# Patient Record
(demographics unavailable — no encounter records)

---

## 2020-06-12 NOTE — NUR
PT AGREEABLE TO STAY AT THIS TIME, EVENING MEDS GIVEN AS ORDERED, PT
REQUESTING SNACK, EMILY MUÑOZ PROVIDED, PT DENIES FURTHER NEEDS, PT
CALMER, FIANCE REMAINS AT BS.

## 2020-06-12 NOTE — NUR
REASSESSMENT COMPLETED, PT RESTING QUIETLY WATCHING TV, PT DENIES PAIN OR
NEEDS, TONGUE EXAMINED WITH NO VISIBLE EDEMA NOTED, VSS, WILL CONT TO MONITOR
CLOSELY FOR CHANGES.

## 2020-06-12 NOTE — NUR
PT WANTING TO LEAVE AMA, ACCORDING TO MOTHER AND SIGNIFICANT OTHER PT HAS ALOT
OF ANXIETY, EXPLAINED WHAT GOING AMA MEANT, PT WANTING TO LEAVE TONIGHT AND
FOLLOW UP WITH PCP LATER. PT SCHEDULED FOR DISCHARGE IN THE AM, ENCOURAGED PT
TO STAY OVERNIGHT FOR CLOSER MONITORING SINCE THE AGENT THAT CAUSED THE EDEMA
IS UNKNOWN.

## 2020-06-12 NOTE — NUR
PT TRANSFERRED FROM 2312 TO CV02 VIA WHEELCHAIR, MONITORS REESTABLISHED, PT
DENIES PAIN OR NEEDS. PT AWAKE, ALERT, AND ORIENTED X 4, ON ROOM AIR, LEFT
HAND PIV WITH NS @ 75CC/HR, NO SWELLING NOTED TO PT'S TONGUE, LIPS, FACE, OR
NECK, PPP, MAEE, SR UP X 2, CALL LIGHT IN REACH.

## 2020-06-12 NOTE — NUR
PT RECIEVED ALERT AND ORIENTED VSS DENIES PAIN, NO SOB OR DIFFICULT
SWALLOWING, NO EDEMA NOTED TO TONGUE/MOUTH/NECK/FACIAL AREA, PT STATES THAT
THE EDEMA SEEMS TO HAVE RESOLVED. L HAND PIV WITH NS INFUSING, FIANCE AT
BEDSIDE, PT DENIES ALL NEEDS, CALL LIGHT WITHIN REACH
PT SEEN BY DR VALENZUELA AND DENIES QUESTIONS
PT ATE DINNER WITHOUT DIFFICULTY

## 2020-06-13 NOTE — NUR
DR VALENZUELA AT BEDSIDE-SPOKE WITH PT REGARDING PLAN AND COURSE OF ACTION WHEN
DISCHARGED--DR CARDENAS NOTIFIED OF OK FOR DISCHARGE WITH DR VALENZUELA

## 2020-06-13 NOTE — NUR
PT AWWAKE AND ALERT--PROVIDED PT WITH AM CLEAN-INDEPENDENT-SALINE LOCKED
IV--REQUESTING WHEN GOES HOME

## 2020-06-13 NOTE — NUR
0845-DR CARDENAS AT BEDSIDE-STRESSED TO PT REQUIRES DR VALENZUELA TO SEE HER BEFORE
CAN BE DISCHARGED HOME
0915-PT APPROACHED NURSE FOR RELEASE TO GO OUTSIDE-INFORMED PT THAT WOULD BE
EQUIVALENT TO LEAVING AGAINST MEDICAL ADVICE-

## 2020-06-13 NOTE — NUR
PT STATING URGENT THAT SHE LEAVE AT THIS TIME-STATED DUE TO FAMILY
EMERGENCY-NOTIFIED DR VALENZUELA OF PT STATEMENT WILL AMA HERSELF-DR CARDENAS SERVICE
PG'D REGARDING SAME--STRESSED TO PT NEED TO STAY TO COMPLETE TREATMENT-NO
DISCHARGE ORDERS